# Patient Record
Sex: FEMALE | Race: WHITE | NOT HISPANIC OR LATINO | URBAN - METROPOLITAN AREA
[De-identification: names, ages, dates, MRNs, and addresses within clinical notes are randomized per-mention and may not be internally consistent; named-entity substitution may affect disease eponyms.]

---

## 2023-04-13 ENCOUNTER — OFFICE VISIT (OUTPATIENT)
Dept: PEDIATRICS | Facility: CLINIC | Age: 18
End: 2023-04-13
Payer: COMMERCIAL

## 2023-04-13 VITALS — WEIGHT: 119 LBS | BODY MASS INDEX: 17.7 KG/M2 | TEMPERATURE: 98.2 F

## 2023-04-13 DIAGNOSIS — R11.11 VOMITING WITHOUT NAUSEA, UNSPECIFIED VOMITING TYPE: Primary | ICD-10-CM

## 2023-04-13 PROCEDURE — 99213 OFFICE O/P EST LOW 20 MIN: CPT | Performed by: STUDENT IN AN ORGANIZED HEALTH CARE EDUCATION/TRAINING PROGRAM

## 2023-04-13 NOTE — PROGRESS NOTES
Subjective   Patient ID: Juliana Chna is a 17 y.o. female who presents for Vomiting.  Today she is accompanied by mom, who serves as an independent historian.   Seen Thursday afternoon in [place].    Symptoms started Saturday  Woke with a cold, runny nose, sore throat   In bed all day sick for 2 days  Day 2, began throwing up, which lasted for another 3 days  Went to ED on day 5 and received IV fluids + medication for nausea  Came home yesterday and was doing fine  This afternoon, threw up again after having lunch  No diarrhea  No belly pain  No fevers  No pain with urination  Currently no other symptoms    Other than the episode of emesis after lunch, has been feeling overall better. Energy returning.    Had bloodwork at OSH that was normal        Objective   Temp 36.8 °C (98.2 °F)   Wt 54 kg (119 lb)   BMI 17.70 kg/m²   BSA: 1.62 meters squared  Growth percentiles: No height on file for this encounter. 32 %ile (Z= -0.47) based on CDC (Girls, 2-20 Years) weight-for-age data using vitals from 4/13/2023.     Physical Exam  Constitutional:       Appearance: Normal appearance.   HENT:      Head: Normocephalic and atraumatic.      Right Ear: Tympanic membrane normal.      Left Ear: Tympanic membrane normal.      Nose: Nose normal.      Mouth/Throat:      Mouth: Mucous membranes are moist.   Eyes:      Conjunctiva/sclera: Conjunctivae normal.   Cardiovascular:      Heart sounds: Normal heart sounds. No murmur heard.  Pulmonary:      Effort: Pulmonary effort is normal.      Breath sounds: Normal breath sounds. No wheezing, rhonchi or rales.   Abdominal:      General: Abdomen is flat. Bowel sounds are normal. There is no distension.      Palpations: Abdomen is soft. There is no mass.      Tenderness: There is no abdominal tenderness.   Musculoskeletal:      Cervical back: Normal range of motion and neck supple.   Neurological:      Mental Status: She is alert.               Assessment/Plan   17 y.o., otherwise healthy  female presenting with what sounds like viral GI illness, improving. Physical exam reassuring including abdominal exam; patient is well hydrated. Continue to advance foods slowly, avoid processed foods, dairy. Please call if any worsening symptoms or new concerns.     Problem List Items Addressed This Visit    None      Doreen Woodruff MD

## 2023-07-18 PROBLEM — J32.9 SINUSITIS: Status: ACTIVE | Noted: 2023-07-18

## 2023-07-18 PROBLEM — B34.9 VIRAL SYNDROME: Status: ACTIVE | Noted: 2023-07-18

## 2023-07-18 PROBLEM — R94.31 ABNORMAL ECG: Status: ACTIVE | Noted: 2023-07-18

## 2023-07-18 PROBLEM — Z97.3 WEARS GLASSES: Status: ACTIVE | Noted: 2023-07-18

## 2023-07-18 PROBLEM — U07.1 COVID-19: Status: ACTIVE | Noted: 2023-07-18

## 2023-07-18 RX ORDER — NORGESTIMATE AND ETHINYL ESTRADIOL 0.25-0.035
1 KIT ORAL DAILY
COMMUNITY
Start: 2023-03-26

## 2023-07-18 RX ORDER — OMEPRAZOLE 20 MG/1
1 CAPSULE, DELAYED RELEASE ORAL DAILY
COMMUNITY
Start: 2023-04-11

## 2023-07-18 RX ORDER — LEVONORGESTREL AND ETHINYL ESTRADIOL 0.1-0.02MG
1 KIT ORAL DAILY
COMMUNITY
Start: 2022-09-27

## 2023-07-18 RX ORDER — ONDANSETRON 4 MG/1
1 TABLET, FILM COATED ORAL EVERY 8 HOURS PRN
COMMUNITY
Start: 2023-04-10

## 2023-07-18 RX ORDER — NORGESTIMATE AND ETHINYL ESTRADIOL 0.25-0.035
1 KIT ORAL
COMMUNITY
Start: 2022-12-12

## 2023-07-18 RX ORDER — AMOXICILLIN AND CLAVULANATE POTASSIUM 875; 125 MG/1; MG/1
1 TABLET, FILM COATED ORAL 2 TIMES DAILY
Qty: 10 TABLET | Refills: 0 | COMMUNITY
Start: 2022-12-22 | End: 2022-12-27

## 2023-07-18 RX ORDER — ONDANSETRON 4 MG/1
1 TABLET, ORALLY DISINTEGRATING ORAL EVERY 8 HOURS PRN
COMMUNITY
Start: 2023-04-11

## 2023-07-18 RX ORDER — AZITHROMYCIN 250 MG/1
TABLET, FILM COATED ORAL
COMMUNITY
Start: 2022-03-17 | End: 2023-08-19 | Stop reason: SDUPTHER

## 2023-07-19 ENCOUNTER — OFFICE VISIT (OUTPATIENT)
Dept: PEDIATRICS | Facility: CLINIC | Age: 18
End: 2023-07-19
Payer: COMMERCIAL

## 2023-07-19 VITALS
HEART RATE: 92 BPM | DIASTOLIC BLOOD PRESSURE: 88 MMHG | SYSTOLIC BLOOD PRESSURE: 129 MMHG | HEIGHT: 70 IN | BODY MASS INDEX: 16.52 KG/M2 | WEIGHT: 115.4 LBS

## 2023-07-19 DIAGNOSIS — R11.15 CYCLICAL VOMITING WITH NAUSEA: Primary | ICD-10-CM

## 2023-07-19 PROCEDURE — 99213 OFFICE O/P EST LOW 20 MIN: CPT | Performed by: PEDIATRICS

## 2023-07-19 NOTE — PROGRESS NOTES
"Subjective   Patient ID: Juliana Chan is a 18 y.o. female who presents for Follow-up (Dehydration/stomach issues).  Today she is accompanied by accompanied by mother.     HPI    SEE NOTE 4/23    Began feeling ill on Thursday 7/13  By Friday morning she was vomiting  intractable  Went to ED for IV fluids and IV zofran  Sent home  Labs reviewed- CBC, CMP, U/A, HCG- all normal  Pt continued to vomit x 24-48h  Never felt better after throwing up  Never had diarrhea  Was in a house with many relatives- no one else became ill  Describes \"esophageal spasm\" throughout the 3 days    [place] is going abroad in 1 month for 5 full months    Review of systems negative unless otherwise indicated in HPI    Objective   /88   Pulse 92   Ht 1.765 m (5' 9.5\")   Wt 52.3 kg (115 lb 6.4 oz)   BMI 16.80 kg/m²     Physical Exam  General: alert, active, in no acute distress  Hydration: well-hydrated, mucous membranes moist, good skin turgor       Assessment/Plan   Problem List Items Addressed This Visit    None  Visit Diagnoses       Cyclical vomiting with nausea    -  Primary    Relevant Orders    Referral to Gastroenterology            Kiki Benoit MD   "

## 2023-07-31 ENCOUNTER — TELEPHONE (OUTPATIENT)
Dept: PEDIATRICS | Facility: CLINIC | Age: 18
End: 2023-07-31
Payer: COMMERCIAL

## 2023-07-31 DIAGNOSIS — R11.15 CYCLICAL VOMITING WITH NAUSEA: Primary | ICD-10-CM

## 2023-07-31 NOTE — TELEPHONE ENCOUNTER
I received a message from Juliana through My Chart    I decided to call patient by phone    Pt had a low TSH at [facility]    Plan  Go to  lab and have labs drawn- will repeat TSH and add free T4 and thyroid antibodies

## 2023-08-01 ENCOUNTER — LAB (OUTPATIENT)
Dept: LAB | Facility: LAB | Age: 18
End: 2023-08-01
Payer: COMMERCIAL

## 2023-08-01 DIAGNOSIS — R11.15 CYCLICAL VOMITING WITH NAUSEA: ICD-10-CM

## 2023-08-01 LAB
THYROPEROXIDASE AB (IU/ML) IN SER/PLAS: <28 IU/ML
THYROTROPIN (MIU/L) IN SER/PLAS BY DETECTION LIMIT <= 0.05 MIU/L: 1.25 MIU/L (ref 0.44–3.98)
THYROXINE (T4) FREE (NG/DL) IN SER/PLAS: 1.21 NG/DL (ref 0.78–1.48)

## 2023-08-01 PROCEDURE — 86376 MICROSOMAL ANTIBODY EACH: CPT

## 2023-08-01 PROCEDURE — 84443 ASSAY THYROID STIM HORMONE: CPT

## 2023-08-01 PROCEDURE — 36415 COLL VENOUS BLD VENIPUNCTURE: CPT

## 2023-08-01 PROCEDURE — 86800 THYROGLOBULIN ANTIBODY: CPT

## 2023-08-01 PROCEDURE — 84439 ASSAY OF FREE THYROXINE: CPT

## 2023-08-04 LAB — THYROGLOBULIN AB (IU/ML) IN SER/PLAS: <0.9 IU/ML (ref 0–4)

## 2023-08-18 DIAGNOSIS — J32.9 SINUSITIS, UNSPECIFIED CHRONICITY, UNSPECIFIED LOCATION: Primary | ICD-10-CM

## 2023-08-19 RX ORDER — AZITHROMYCIN 250 MG/1
TABLET, FILM COATED ORAL
Qty: 6 TABLET | Refills: 0 | Status: SHIPPED | OUTPATIENT
Start: 2023-08-19

## 2025-01-10 ENCOUNTER — LAB (OUTPATIENT)
Dept: LAB | Facility: LAB | Age: 20
End: 2025-01-10
Payer: COMMERCIAL

## 2025-01-10 ENCOUNTER — OFFICE VISIT (OUTPATIENT)
Dept: PEDIATRICS | Facility: CLINIC | Age: 20
End: 2025-01-10
Payer: COMMERCIAL

## 2025-01-10 VITALS — WEIGHT: 116.5 LBS | TEMPERATURE: 98.5 F | BODY MASS INDEX: 16.72 KG/M2

## 2025-01-10 DIAGNOSIS — E86.0 MILD DEHYDRATION: Primary | ICD-10-CM

## 2025-01-10 DIAGNOSIS — E86.0 MILD DEHYDRATION: ICD-10-CM

## 2025-01-10 LAB
ALBUMIN SERPL BCP-MCNC: 4.7 G/DL (ref 3.4–5)
ALP SERPL-CCNC: 33 U/L (ref 33–110)
ALT SERPL W P-5'-P-CCNC: 16 U/L (ref 7–45)
ANION GAP SERPL CALC-SCNC: 14 MMOL/L (ref 10–20)
AST SERPL W P-5'-P-CCNC: 17 U/L (ref 9–39)
BASOPHILS # BLD AUTO: 0.04 X10*3/UL (ref 0–0.1)
BASOPHILS NFR BLD AUTO: 0.8 %
BILIRUB SERPL-MCNC: 0.5 MG/DL (ref 0–1.2)
BUN SERPL-MCNC: 11 MG/DL (ref 6–23)
CALCIUM SERPL-MCNC: 10 MG/DL (ref 8.6–10.6)
CHLORIDE SERPL-SCNC: 107 MMOL/L (ref 98–107)
CK SERPL-CCNC: 42 U/L (ref 0–215)
CO2 SERPL-SCNC: 24 MMOL/L (ref 21–32)
CREAT SERPL-MCNC: 0.77 MG/DL (ref 0.5–1.05)
EGFRCR SERPLBLD CKD-EPI 2021: >90 ML/MIN/1.73M*2
EOSINOPHIL # BLD AUTO: 0.03 X10*3/UL (ref 0–0.7)
EOSINOPHIL NFR BLD AUTO: 0.6 %
ERYTHROCYTE [DISTWIDTH] IN BLOOD BY AUTOMATED COUNT: 12.9 % (ref 11.5–14.5)
FERRITIN SERPL-MCNC: 33 NG/ML (ref 8–150)
GLUCOSE SERPL-MCNC: 96 MG/DL (ref 74–99)
HCT VFR BLD AUTO: 42.1 % (ref 36–46)
HGB BLD-MCNC: 14.1 G/DL (ref 12–16)
IMM GRANULOCYTES # BLD AUTO: 0.01 X10*3/UL (ref 0–0.7)
IMM GRANULOCYTES NFR BLD AUTO: 0.2 % (ref 0–0.9)
IRON SATN MFR SERPL: 31 % (ref 25–45)
IRON SERPL-MCNC: 127 UG/DL (ref 35–150)
LYMPHOCYTES # BLD AUTO: 1.94 X10*3/UL (ref 1.2–4.8)
LYMPHOCYTES NFR BLD AUTO: 40.6 %
MCH RBC QN AUTO: 29.8 PG (ref 26–34)
MCHC RBC AUTO-ENTMCNC: 33.5 G/DL (ref 32–36)
MCV RBC AUTO: 89 FL (ref 80–100)
MONOCYTES # BLD AUTO: 0.71 X10*3/UL (ref 0.1–1)
MONOCYTES NFR BLD AUTO: 14.9 %
NEUTROPHILS # BLD AUTO: 2.05 X10*3/UL (ref 1.2–7.7)
NEUTROPHILS NFR BLD AUTO: 42.9 %
NRBC BLD-RTO: 0 /100 WBCS (ref 0–0)
PLATELET # BLD AUTO: 238 X10*3/UL (ref 150–450)
POTASSIUM SERPL-SCNC: 3.8 MMOL/L (ref 3.5–5.3)
PROT SERPL-MCNC: 7.4 G/DL (ref 6.4–8.2)
RBC # BLD AUTO: 4.73 X10*6/UL (ref 4–5.2)
SODIUM SERPL-SCNC: 141 MMOL/L (ref 136–145)
TIBC SERPL-MCNC: 404 UG/DL (ref 240–445)
UIBC SERPL-MCNC: 277 UG/DL (ref 110–370)
WBC # BLD AUTO: 4.8 X10*3/UL (ref 4.4–11.3)

## 2025-01-10 PROCEDURE — 80053 COMPREHEN METABOLIC PANEL: CPT

## 2025-01-10 PROCEDURE — 82728 ASSAY OF FERRITIN: CPT

## 2025-01-10 PROCEDURE — 83540 ASSAY OF IRON: CPT

## 2025-01-10 PROCEDURE — 85025 COMPLETE CBC W/AUTO DIFF WBC: CPT

## 2025-01-10 PROCEDURE — 82550 ASSAY OF CK (CPK): CPT

## 2025-01-10 PROCEDURE — 83550 IRON BINDING TEST: CPT

## 2025-01-10 PROCEDURE — 99214 OFFICE O/P EST MOD 30 MIN: CPT | Performed by: PEDIATRICS

## 2025-01-10 NOTE — PROGRESS NOTES
Subjective   Patient ID: Juliana Chan is a 19 y.o. female who presents for Dehydration.  Today she is accompanied by alone.     HPI    1.5 weeks ago pt feels she had a stomach virus while on vacation  Emesis x 1  Diarrhea x 1  Nausea persisted and pt was unable to eat or drink  [facility] a [facility]  While putting the IV in she passed out  Med spa refused to give fluids and sent her home  Every morning she woke feeling dehydrated  Woke this past Monday morning with nausea- thinks she had a second small illness  Developed a headache x 24 hours  Almost back to herself by Tuesday  Again since then Every morning she wakes feeling dehydrated  Today nausea resolved  Today she is worried she could still be dehydrated  Mild body aches- lower back and legs  Worried about her kidneys    ALSO  Pt's sibling was recently diagnosed with hematochromatosis  Mom wants her iron levels checked    Review of systems negative unless otherwise indicated in HPI    Objective   Temp 36.9 °C (98.5 °F)   Wt 52.8 kg (116 lb 8 oz)   BMI 16.72 kg/m²     Physical Exam  General: alert, active, in no acute distress  Hydration: well-hydrated, mucous membranes moist, good skin turgor  Throat: moist mucous membranes without erythema, exudates or petechiae, no post-nasal drainage seen  Neck: no lymphadenopathy  Lungs: clear to auscultation, no wheezing, crackles or rhonchi, breathing unlabored- no CVA tenderness  Heart: Normal PMI. regular rate and rhythm, normal S1, S2, no murmurs or gallops.       Assessment/Plan   Problem List Items Addressed This Visit    None  Visit Diagnoses       Mild dehydration    -  Primary    Relevant Orders    Comprehensive metabolic panel    Creatine Kinase    Iron and TIBC    Ferritin    CBC and Auto Differential          Hx of Viral GE- patient is concerned for mild dehydration- exam is reassuring  New family hx of Hematochromatosis  I attempt to reassure pt- she looks hydrated from a medical perspective- she would like to  pursue lab eval- I have asked pt to let lab know she has passed out with needles in the past  FLUIDS with SALT  Add labs for iron overload- pt aware ultimately this is a genetic test which I will recommend she pursue if labs abnormal    Kiki Benoit MD